# Patient Record
Sex: MALE | Employment: STUDENT | URBAN - METROPOLITAN AREA
[De-identification: names, ages, dates, MRNs, and addresses within clinical notes are randomized per-mention and may not be internally consistent; named-entity substitution may affect disease eponyms.]

---

## 2020-02-06 ENCOUNTER — OFFICE VISIT (OUTPATIENT)
Dept: DERMATOLOGY | Facility: CLINIC | Age: 20
End: 2020-02-06
Payer: COMMERCIAL

## 2020-02-06 DIAGNOSIS — D22.9 MULTIPLE BENIGN NEVI: Primary | ICD-10-CM

## 2020-02-06 ASSESSMENT — PAIN SCALES - GENERAL: PAINLEVEL: NO PAIN (0)

## 2020-02-06 NOTE — NURSING NOTE
Dermatology Rooming Note    Joe Rosales's goals for this visit include:   Chief Complaint   Patient presents with     Skin Check     Joe is here today to have two moles looked at.      ETHAN Almazan

## 2020-02-06 NOTE — PATIENT INSTRUCTIONS
Sun protective clothing and Resources     L[place] (www.[employer].com)  Athleta (www.athleta.Fleck - The Bigger Picture)  Bernie Life (www.[employer].Fleck - The Bigger Picture)  Carve Designs (Miraculins) - affordable  Skinz (uvskinz.com)    Long sleeve - Sanaz Cool DRI UPF 50 or [place] PFG UPF 50  Hoodie - [place] PFG UPF 50  Swimshirt/Rash Guard - [place]ola UPF 50 (on Amazon)  Neck - Outdoor Research Ubertubes (www.outdoorresOhai.com)  The ABCDEs of Melanoma    Skin cancer can develop anywhere on the skin. Ask someone for help when checking your skin, especially in hard to see places. If you notice a mole different from others, or that changes, enlarges, itches, or bleeds (even if it is small), you should see a dermatologist.

## 2020-02-06 NOTE — PROGRESS NOTES
[facility] Dermatology Note      Dermatology Problem List:  1. Multiple benign nevi    Encounter Date: Feb 6, 2020    CC:  Chief Complaint   Patient presents with     Skin Check     Joe is here today to have two moles looked at.          History of Present Illness:  Mr. Joe Rosales is a 19 year old male who is new to the clinic and presents for lesions of concern. At today's visit, the patient notes lesions of concern on his left forearm and around the left armpit. He states the spot on his left forearm is waxy and the spot on the left armpit is oddly shaped. The patient denies that these lesions are symptomatic or irritating. The patient denies a personal and family history of skin cancer. 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 The patient states he typically burns in the sun and wears sunscreen regularly. He denies additional lesions or areas of concern. The patient denies painful, itching, tingling or bleeding lesions unless otherwise noted.    Past Medical History:   There is no problem list on file for this patient.    History reviewed. No pertinent past medical history.  History reviewed. No pertinent surgical history.    Social History:   reports that he has never smoked. He has never used smokeless tobacco.    Family History:  Family History   Problem Relation Age of Onset     Melanoma No family hx of      Skin Cancer No family hx of        Medications:  No current outpatient medications on file.       No Known Allergies    Review of Systems:  -Constitutional: The patient denies fatigue, fevers, chills, unintended weight loss, and night sweats.  -HEENT: Patient denies nonhealing oral sores.  -Skin: As above in HPI. No additional skin concerns.    Physical exam:  Vitals: There were no vitals taken for this visit.  GEN: This is a well developed, well-nourished male in no acute distress, in a pleasant mood.    SKIN: Focused examination of the  face, neck, bilateral lower arms and right upper arm was performed.  -Benign appearing brown regular nevi on the left forearm x1 and left axilla x1  -No other lesions of concern on areas examined.       Impression/Plan:  Benign nevi - left forearm x 1, left axilla x 1 -scattered benign appearing nevi on he nck and bilateral forearms. Patient declined any other skin concerns today.  - Reassured benign  - No further intervention required. Patient to report changes.  - Provided sunscreen, melanoma and sun protective clothing handouts    CC Dr. Gonzalez on close of this encounter.  Follow-up prn for new or changing lesions.       Staff Involved:  Staff/Scribe    Scribe Disclosure:  I, Malcom Acosta, am serving as a scribe to document services personally performed by Caitlyn Torres PA-C, based on data collection and the provider's statements to me.     Provider Disclosure:   The documentation recorded by the scribe accurately reflects the services I personally performed and the decisions made by me.    All risks, benefits and alternatives were discussed with patient.  Patient is in agreement and understands the assessment and plan.  All questions were answered.    Caitlyn Torres PA-C, MPAS  [facility] [place]: Phone: [phone], Fax: [phone]  [facility]: Phone: [phone],  Fax: [phone]

## 2020-02-06 NOTE — LETTER
2/6/2020       RE: Joe Rosales  [address] [facility] [address]     Dear Colleague,    Thank you for referring your patient, Joe Rosales, to the [facility] DERMATOLOGY at [facility]. Please see a copy of my visit note below.    [facility] Dermatology Note      Dermatology Problem List:  1. Multiple benign nevi    Encounter Date: Feb 6, 2020    CC:  Chief Complaint   Patient presents with     Skin Check     Joe is here today to have two moles looked at.          History of Present Illness:  Mr. Joe Rosales is a 19 year old male who is new to the clinic and presents for lesions of concern. At today's visit, the patient notes lesions of concern on his left forearm and around the left armpit. He states the spot on his left forearm is waxy and the spot on the left armpit is oddly shaped. The patient denies that these lesions are symptomatic or irritating. The patient denies a personal and family history of skin cancer. 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 The patient states he typically burns in the sun and wears sunscreen regularly. He denies additional lesions or areas of concern. The patient denies painful, itching, tingling or bleeding lesions unless otherwise noted.    Past Medical History:   There is no problem list on file for this patient.    History reviewed. No pertinent past medical history.  History reviewed. No pertinent surgical history.    Social History:   reports that he has never smoked. He has never used smokeless tobacco.    Family History:  Family History   Problem Relation Age of Onset     Melanoma No family hx of      Skin Cancer No family hx of        Medications:  No current outpatient medications on file.       No Known Allergies    Review of Systems:  -Constitutional: The patient denies fatigue, fevers, chills, unintended weight loss, and night sweats.  -HEENT: Patient denies  nonhealing oral sores.  -Skin: As above in HPI. No additional skin concerns.    Physical exam:  Vitals: There were no vitals taken for this visit.  GEN: This is a well developed, well-nourished male in no acute distress, in a pleasant mood.    SKIN: Focused examination of the face, neck, bilateral lower arms and right upper arm was performed.  -Benign appearing brown regular nevi on the left forearm x1 and left axilla x1  -No other lesions of concern on areas examined.       Impression/Plan:  Benign nevi - left forearm x 1, left axilla x 1 -scattered benign appearing nevi on he nck and bilateral forearms. Patient declined any other skin concerns today.  - Reassured benign  - No further intervention required. Patient to report changes.  - Provided sunscreen, melanoma and sun protective clothing handouts    CC Dr. Gonzalez on close of this encounter.  Follow-up prn for new or changing lesions.       Staff Involved:  Staff/Scribe    Scribe Disclosure:  I, Malcom Acosta, am serving as a scribe to document services personally performed by Caitlyn Torres PA-C, based on data collection and the provider's statements to me.     Provider Disclosure:   The documentation recorded by the scribe accurately reflects the services I personally performed and the decisions made by me.    All risks, benefits and alternatives were discussed with patient.  Patient is in agreement and understands the assessment and plan.  All questions were answered.    Caitlyn Torres PA-C, MPAS  [facility] [place]: Phone: [phone], Fax: [phone]  [facility]: Phone: [phone],  Fax: [phone]

## 2020-03-11 ENCOUNTER — HEALTH MAINTENANCE LETTER (OUTPATIENT)
Age: 20
End: 2020-03-11

## 2021-01-04 ENCOUNTER — HEALTH MAINTENANCE LETTER (OUTPATIENT)
Age: 21
End: 2021-01-04

## 2021-04-25 ENCOUNTER — HEALTH MAINTENANCE LETTER (OUTPATIENT)
Age: 21
End: 2021-04-25

## 2021-10-10 ENCOUNTER — HEALTH MAINTENANCE LETTER (OUTPATIENT)
Age: 21
End: 2021-10-10

## 2022-05-22 ENCOUNTER — HEALTH MAINTENANCE LETTER (OUTPATIENT)
Age: 22
End: 2022-05-22

## 2022-09-18 ENCOUNTER — HEALTH MAINTENANCE LETTER (OUTPATIENT)
Age: 22
End: 2022-09-18

## 2023-06-04 ENCOUNTER — HEALTH MAINTENANCE LETTER (OUTPATIENT)
Age: 23
End: 2023-06-04